# Patient Record
Sex: FEMALE | URBAN - METROPOLITAN AREA
[De-identification: names, ages, dates, MRNs, and addresses within clinical notes are randomized per-mention and may not be internally consistent; named-entity substitution may affect disease eponyms.]

---

## 2022-10-06 ENCOUNTER — OCCUPATIONAL HEALTH (OUTPATIENT)
Dept: URGENT CARE | Facility: CLINIC | Age: 22
End: 2022-10-06

## 2022-10-06 PROCEDURE — 80305 PR COLLECTION ONLY DRUG SCREEN: ICD-10-PCS | Mod: S$GLB,,, | Performed by: NURSE PRACTITIONER

## 2022-10-06 PROCEDURE — 80305 DRUG TEST PRSMV DIR OPT OBS: CPT | Mod: S$GLB,,, | Performed by: NURSE PRACTITIONER

## 2023-03-27 ENCOUNTER — OFFICE VISIT (OUTPATIENT)
Dept: URGENT CARE | Facility: CLINIC | Age: 23
End: 2023-03-27
Payer: COMMERCIAL

## 2023-03-27 VITALS
SYSTOLIC BLOOD PRESSURE: 119 MMHG | RESPIRATION RATE: 18 BRPM | HEIGHT: 64 IN | WEIGHT: 182.19 LBS | DIASTOLIC BLOOD PRESSURE: 66 MMHG | BODY MASS INDEX: 31.1 KG/M2 | TEMPERATURE: 98 F | OXYGEN SATURATION: 99 % | HEART RATE: 89 BPM

## 2023-03-27 DIAGNOSIS — M25.571 ACUTE RIGHT ANKLE PAIN: Primary | ICD-10-CM

## 2023-03-27 DIAGNOSIS — S93.401A SPRAIN OF RIGHT ANKLE, UNSPECIFIED LIGAMENT, INITIAL ENCOUNTER: ICD-10-CM

## 2023-03-27 PROCEDURE — 99203 OFFICE O/P NEW LOW 30 MIN: CPT | Mod: S$GLB,,, | Performed by: NURSE PRACTITIONER

## 2023-03-27 PROCEDURE — 73610 XR ANKLE COMPLETE 3 VIEW RIGHT: ICD-10-PCS | Mod: RT,S$GLB,, | Performed by: RADIOLOGY

## 2023-03-27 PROCEDURE — 99203 PR OFFICE/OUTPT VISIT, NEW, LEVL III, 30-44 MIN: ICD-10-PCS | Mod: S$GLB,,, | Performed by: NURSE PRACTITIONER

## 2023-03-27 PROCEDURE — 73610 X-RAY EXAM OF ANKLE: CPT | Mod: RT,S$GLB,, | Performed by: RADIOLOGY

## 2023-03-27 NOTE — PATIENT INSTRUCTIONS
Ibuprofen or Aleve over-the-counter as directed for pain/discomfort/swelling/inflammation.    Keep right foot elevated as much as possible.    Ice to the affected area for 15-20 minutes every 2-4 hours for the 1st 48 hours then just as needed.    Limit activity not to aggravate symptoms, rest frequently.    Prescription provided to you for a walking boot.  This can be obtained at a durable medical equipment company locally.    Referral was placed to Podiatry.  Someone should be calling you to schedule an appointment.

## 2023-03-27 NOTE — PROGRESS NOTES
"Subjective:       Patient ID: Heaven Ledesma is a 22 y.o. female.    Vitals:  height is 5' 4" (1.626 m) and weight is 82.6 kg (182 lb 3.2 oz). Her oral temperature is 97.8 °F (36.6 °C). Her blood pressure is 119/66 and her pulse is 89. Her respiration is 18 and oxygen saturation is 99%.     Chief Complaint: Ankle Pain    Pt states that her symptoms started on  Saturday. Patient states that her symptoms are the following:  right foot took a step and felt a sharp pain in the interior portion of her right ankle. Patient states that the pain level is a 6 with mobility and palpation. Patient states that there is no pain when she is just sitting still. Patient denies any other symptoms.     Patient denies injury or trauma states that she was taking a step and walking normally felt sudden pain to the front of her right ankle radiating up.    Ankle Pain   The incident occurred 2 days ago. The incident occurred at home. There was no injury mechanism. The pain is present in the right ankle. The quality of the pain is described as aching. The pain is at a severity of 6/10. The pain is moderate. The pain has been Intermittent since onset. Associated symptoms include an inability to bear weight. Pertinent negatives include no numbness. She reports no foreign bodies present. The symptoms are aggravated by palpation and weight bearing. She has tried nothing for the symptoms. The treatment provided no relief.     Musculoskeletal:  Positive for joint pain and joint swelling. Negative for trauma and abnormal ROM of joint.        Front right ankle   Skin:  Negative for color change, wound, lesion, skin thickening/induration, erythema and bruising.   Neurological:  Negative for numbness and tingling.     Objective:      Physical Exam   Constitutional: She is oriented to person, place, and time. She appears well-developed.  Non-toxic appearance. She does not appear ill. No distress.   HENT:   Head: Normocephalic and atraumatic. "   Mouth/Throat: Oropharynx is clear and moist. Mucous membranes are moist.   Eyes: Conjunctivae and EOM are normal.   Cardiovascular: Normal rate.   Pulmonary/Chest: Effort normal. No respiratory distress.   Abdominal: Normal appearance.   Musculoskeletal: Normal range of motion.         General: Tenderness present. No swelling, deformity or signs of injury. Normal range of motion.      Right ankle: She exhibits normal range of motion, no ecchymosis, no deformity and normal pulse. Tenderness. Achilles tendon normal.        Legs:       Right foot: Normal.   Neurological: no focal deficit. She is alert and oriented to person, place, and time.   Skin: Skin is warm and dry. Capillary refill takes less than 2 seconds. No bruising, No erythema and No lesion   Psychiatric: Her behavior is normal. Mood normal.   Nursing note and vitals reviewed.      Assessment:       1. Acute right ankle pain    2. Sprain of right ankle, unspecified ligament, initial encounter          Right ankle xray: FINDINGS:  No fracture or dislocation.  There is small chronic spur formation or old avulsion injury of the tip of the lateral malleolus.  There is mild soft tissue swelling about the ankle.   Impression:   No acute osseous abnormality    Plan:         Acute right ankle pain  -     XR ANKLE COMPLETE 3 VIEW RIGHT; Future; Expected date: 03/27/2023    Sprain of right ankle, unspecified ligament, initial encounter  -     AIR CAST WALKER BOOT FOR HOME USE  -     Ambulatory referral/consult to Podiatry  -     E - OTHER

## 2023-03-28 ENCOUNTER — TELEPHONE (OUTPATIENT)
Dept: FAMILY MEDICINE | Facility: CLINIC | Age: 23
End: 2023-03-28
Payer: COMMERCIAL

## 2023-03-29 ENCOUNTER — TELEPHONE (OUTPATIENT)
Dept: FAMILY MEDICINE | Facility: CLINIC | Age: 23
End: 2023-03-29
Payer: COMMERCIAL

## 2023-03-30 ENCOUNTER — TELEPHONE (OUTPATIENT)
Dept: FAMILY MEDICINE | Facility: CLINIC | Age: 23
End: 2023-03-30
Payer: COMMERCIAL

## 2023-03-30 NOTE — TELEPHONE ENCOUNTER
Called patient for the third time regarding podiatry referral and no answer, voicemail not setup. JA

## 2024-12-23 ENCOUNTER — OFFICE VISIT (OUTPATIENT)
Dept: URGENT CARE | Facility: CLINIC | Age: 24
End: 2024-12-23
Payer: MEDICAID

## 2024-12-23 VITALS
DIASTOLIC BLOOD PRESSURE: 83 MMHG | RESPIRATION RATE: 16 BRPM | BODY MASS INDEX: 31.07 KG/M2 | OXYGEN SATURATION: 97 % | TEMPERATURE: 99 F | WEIGHT: 182 LBS | SYSTOLIC BLOOD PRESSURE: 116 MMHG | HEIGHT: 64 IN | HEART RATE: 97 BPM

## 2024-12-23 DIAGNOSIS — J40 BRONCHITIS: Primary | ICD-10-CM

## 2024-12-23 DIAGNOSIS — R05.9 COUGH, UNSPECIFIED TYPE: ICD-10-CM

## 2024-12-23 PROCEDURE — 71046 X-RAY EXAM CHEST 2 VIEWS: CPT | Mod: S$GLB,,, | Performed by: RADIOLOGY

## 2024-12-23 RX ORDER — PROMETHAZINE HYDROCHLORIDE AND DEXTROMETHORPHAN HYDROBROMIDE 6.25; 15 MG/5ML; MG/5ML
5 SYRUP ORAL NIGHTLY PRN
Qty: 50 ML | Refills: 0 | Status: SHIPPED | OUTPATIENT
Start: 2024-12-23 | End: 2025-01-02

## 2024-12-23 RX ORDER — BROMPHENIRAMINE MALEATE, PSEUDOEPHEDRINE HYDROCHLORIDE, AND DEXTROMETHORPHAN HYDROBROMIDE 2; 30; 10 MG/5ML; MG/5ML; MG/5ML
10 SYRUP ORAL 4 TIMES DAILY PRN
Qty: 118 ML | Refills: 0 | Status: SHIPPED | OUTPATIENT
Start: 2024-12-23 | End: 2025-01-02

## 2024-12-23 RX ORDER — PREDNISONE 20 MG/1
20 TABLET ORAL 2 TIMES DAILY
Qty: 10 TABLET | Refills: 0 | Status: SHIPPED | OUTPATIENT
Start: 2024-12-23 | End: 2024-12-28

## 2024-12-23 RX ORDER — AZITHROMYCIN 250 MG/1
TABLET, FILM COATED ORAL
Qty: 6 TABLET | Refills: 0 | Status: SHIPPED | OUTPATIENT
Start: 2024-12-23

## 2024-12-23 RX ORDER — ALBUTEROL SULFATE 90 UG/1
2 INHALANT RESPIRATORY (INHALATION) EVERY 6 HOURS PRN
Qty: 6.7 G | Refills: 0 | Status: SHIPPED | OUTPATIENT
Start: 2024-12-23 | End: 2025-01-22

## 2024-12-23 RX ORDER — DEXAMETHASONE SODIUM PHOSPHATE 4 MG/ML
8 INJECTION, SOLUTION INTRA-ARTICULAR; INTRALESIONAL; INTRAMUSCULAR; INTRAVENOUS; SOFT TISSUE
Status: DISCONTINUED | OUTPATIENT
Start: 2024-12-23 | End: 2024-12-23

## 2024-12-23 NOTE — PROGRESS NOTES
"Subjective:      Patient ID: Heaven Ledesma is a 24 y.o. female.    Vitals:  height is 5' 4" (1.626 m) and weight is 82.6 kg (182 lb). Her oral temperature is 98.8 °F (37.1 °C). Her blood pressure is 116/83 and her pulse is 97. Her respiration is 16 and oxygen saturation is 97%.     Chief Complaint: Cough    Cough X 3 weeks.  Patient states cough is worsening and productive.     Cough  This is a new problem. The current episode started 1 to 4 weeks ago. The cough is Productive of sputum. Associated symptoms include headaches, postnasal drip and shortness of breath. Pertinent negatives include no fever. She has tried OTC cough suppressant (Robitussin) for the symptoms.       Constitution: Negative for fever.   HENT:  Positive for congestion and postnasal drip.    Neck: neck negative.   Cardiovascular: Negative.    Eyes: Negative.    Respiratory:  Positive for cough, sputum production and shortness of breath. Negative for asthma.    Gastrointestinal:  Positive for nausea.   Endocrine: negative.   Genitourinary: Negative.    Musculoskeletal: Negative.    Skin: Negative.    Allergic/Immunologic: Positive for immunizations up-to-date. Negative for asthma.   Neurological:  Positive for headaches.   Hematologic/Lymphatic: Negative.    Psychiatric/Behavioral: Negative.        Objective:     Physical Exam   Constitutional: She is oriented to person, place, and time. She is cooperative.  Non-toxic appearance. She does not appear ill. No distress.   HENT:   Head: Normocephalic and atraumatic.   Ears:   Right Ear: External ear normal.   Left Ear: External ear normal.   Nose: Nose normal. No rhinorrhea or congestion.   Mouth/Throat: Uvula is midline, oropharynx is clear and moist and mucous membranes are normal. Mucous membranes are moist. No oropharyngeal exudate or posterior oropharyngeal erythema. Oropharynx is clear.   Eyes: Conjunctivae and lids are normal. Pupils are equal, round, and reactive to light. Extraocular " movement intact   Neck: Trachea normal and phonation normal. Neck supple.   Cardiovascular: Normal rate, regular rhythm, normal heart sounds and normal pulses.   Pulmonary/Chest: Effort normal and breath sounds normal. She has no wheezes. She has no rhonchi. She has no rales.   Abdominal: Normal appearance. Soft. flat abdomen There is no abdominal tenderness.   Musculoskeletal: Normal range of motion.         General: Normal range of motion.   Lymphadenopathy:     She has no cervical adenopathy.   Neurological: no focal deficit. She is alert, oriented to person, place, and time and at baseline. She has normal motor skills, normal sensation and intact cranial nerves (2-12). Gait and coordination normal. GCS eye subscore is 4. GCS verbal subscore is 5. GCS motor subscore is 6.   Skin: Skin is warm, dry and not diaphoretic. Capillary refill takes 2 to 3 seconds.   Psychiatric: She experiences Normal attention and Normal perception. Her speech is normal and behavior is normal. Mood, judgment and thought content normal.   Nursing note and vitals reviewed.      Assessment:     1. Bronchitis    2. Cough, unspecified type        Plan:       Bronchitis  -     Discontinue: dexAMETHasone injection 8 mg  -     azithromycin (Z-DALLAS) 250 MG tablet; Take 2 tablets by mouth on day 1; Take 1 tablet by mouth on days 2-5  Dispense: 6 tablet; Refill: 0  -     brompheniramine-pseudoeph-DM (BROMFED DM) 2-30-10 mg/5 mL Syrp; Take 10 mLs by mouth 4 (four) times daily as needed (Cough).  Dispense: 118 mL; Refill: 0  -     promethazine-dextromethorphan (PROMETHAZINE-DM) 6.25-15 mg/5 mL Syrp; Take 5 mLs by mouth nightly as needed (night time cough).  Dispense: 50 mL; Refill: 0  -     predniSONE (DELTASONE) 20 MG tablet; Take 1 tablet (20 mg total) by mouth 2 (two) times daily. for 5 days  Dispense: 10 tablet; Refill: 0  -     albuterol (PROVENTIL HFA) 90 mcg/actuation inhaler; Inhale 2 puffs into the lungs every 6 (six) hours as needed for  Wheezing. Rescue  Dispense: 6.7 g; Refill: 0    Cough, unspecified type  -     XR CHEST PA AND LATERAL; Future; Expected date: 12/23/2024      No obvious pneumonia on x-ray.  Illness began approximately 3 weeks ago.  Sister also ill, sister has a improved she remains symptomatic.  Likely developing a secondary bacterial infection.  Differential considerations viral URI, pneumonia, long-haul COVID.  Dex injection refused by patient